# Patient Record
Sex: FEMALE | Race: WHITE | NOT HISPANIC OR LATINO
[De-identification: names, ages, dates, MRNs, and addresses within clinical notes are randomized per-mention and may not be internally consistent; named-entity substitution may affect disease eponyms.]

---

## 2024-01-30 ENCOUNTER — LABORATORY RESULT (OUTPATIENT)
Age: 54
End: 2024-01-30

## 2024-01-30 ENCOUNTER — NON-APPOINTMENT (OUTPATIENT)
Age: 54
End: 2024-01-30

## 2024-01-30 ENCOUNTER — TRANSCRIPTION ENCOUNTER (OUTPATIENT)
Age: 54
End: 2024-01-30

## 2024-01-30 ENCOUNTER — APPOINTMENT (OUTPATIENT)
Dept: OBGYN | Facility: CLINIC | Age: 54
End: 2024-01-30
Payer: COMMERCIAL

## 2024-01-30 VITALS
OXYGEN SATURATION: 96 % | DIASTOLIC BLOOD PRESSURE: 78 MMHG | SYSTOLIC BLOOD PRESSURE: 111 MMHG | BODY MASS INDEX: 26.33 KG/M2 | HEIGHT: 65 IN | WEIGHT: 158 LBS | HEART RATE: 85 BPM

## 2024-01-30 DIAGNOSIS — N95.1 MENOPAUSAL AND FEMALE CLIMACTERIC STATES: ICD-10-CM

## 2024-01-30 DIAGNOSIS — R87.810 CERVICAL HIGH RISK HUMAN PAPILLOMAVIRUS (HPV) DNA TEST POSITIVE: ICD-10-CM

## 2024-01-30 DIAGNOSIS — Z87.42 PERSONAL HISTORY OF OTHER DISEASES OF THE FEMALE GENITAL TRACT: ICD-10-CM

## 2024-01-30 DIAGNOSIS — R68.89 OTHER GENERAL SYMPTOMS AND SIGNS: ICD-10-CM

## 2024-01-30 DIAGNOSIS — L85.3 XEROSIS CUTIS: ICD-10-CM

## 2024-01-30 DIAGNOSIS — N95.2 POSTMENOPAUSAL ATROPHIC VAGINITIS: ICD-10-CM

## 2024-01-30 DIAGNOSIS — F32.A ANXIETY DISORDER, UNSPECIFIED: ICD-10-CM

## 2024-01-30 DIAGNOSIS — Z78.9 OTHER SPECIFIED HEALTH STATUS: ICD-10-CM

## 2024-01-30 DIAGNOSIS — F41.9 ANXIETY DISORDER, UNSPECIFIED: ICD-10-CM

## 2024-01-30 DIAGNOSIS — G47.00 INSOMNIA, UNSPECIFIED: ICD-10-CM

## 2024-01-30 DIAGNOSIS — N84.1 POLYP OF CERVIX UTERI: ICD-10-CM

## 2024-01-30 PROBLEM — Z00.00 ENCOUNTER FOR PREVENTIVE HEALTH EXAMINATION: Status: ACTIVE | Noted: 2024-01-30

## 2024-01-30 PROCEDURE — 99204 OFFICE O/P NEW MOD 45 MIN: CPT

## 2024-01-30 RX ORDER — BUSPIRONE HYDROCHLORIDE 7.5 MG/1
7.5 TABLET ORAL
Refills: 0 | Status: ACTIVE | COMMUNITY

## 2024-01-30 RX ORDER — ESTRADIOL 0.1 MG/G
0.1 CREAM VAGINAL
Qty: 1 | Refills: 3 | Status: ACTIVE | COMMUNITY
Start: 2024-01-30 | End: 1900-01-01

## 2024-01-30 RX ORDER — BUPROPION HYDROCHLORIDE 100 MG/1
TABLET, FILM COATED ORAL
Refills: 0 | Status: ACTIVE | COMMUNITY

## 2024-01-30 NOTE — DISCUSSION/SUMMARY
[FreeTextEntry1] : *found that the progesterone intensely interfere with mood then put on an ocp but got polyps and had to stop for irreg bleeding  had D and C for 2 cm polyp (benign) 2 yrs ago, no cycle, blood work showed menopause for sure (had been off pill for months at that point, but had been on ocp for yrs before that)

## 2024-01-30 NOTE — PHYSICAL EXAM
[Appropriately responsive] : appropriately responsive [Alert] : alert [No Acute Distress] : no acute distress [Soft] : soft [Non-tender] : non-tender [Non-distended] : non-distended [No HSM] : No HSM [No Lesions] : no lesions [No Mass] : no mass [Oriented x3] : oriented x3 [Labia Majora] : normal [Labia Minora] : normal [Atrophy] : atrophy [Normal] : normal [FreeTextEntry4] : expected atrophy of menopause

## 2024-01-30 NOTE — PLAN
[FreeTextEntry1] : here for problem visit  1. meonopause sx dry eye dry skin vaginal dryness insomnia and temp regulation issues mood changes: note on wellburtin and buspar and has therapist  had hpv 16/18 neg but other hpv positive on recent pap: I said I don't agree and may need colpo so repeat pap done today  *found that the progesterone intensely interfere with mood then put on an ocp but got polyps and had to stop for irreg bleeding  had D and C for 2 cm polyp (benign) 2 yrs ago, no cycle, blood work showed menopause for sure (had been off pill for months at that point, but had been on ocp for yrs before that)  *decided to try vaginal estrogen for now , d/w pt HT patch but she really hated micronized progesterone mood changes, r/b/a discussed, may use a bit in lower and upper eyelids as well dw pt she may do better with mirena and patch but not sure if she wants to go this route  Over 50% of face to face time of visit counseling and coordination of care. (tot 45 min)

## 2024-01-30 NOTE — HISTORY OF PRESENT ILLNESS
[Patient reported PAP Smear was normal] : Patient reported PAP Smear was normal [Y] : Positive pregnancy history [Currently Active] : currently active [Men] : men [No] : never pregnant [Difficulty with [place]] : difficulty with intercourse [Insomnia] : insomnia [Normal Amount/Duration] :  normal amount and duration [Regular Cycle Intervals] : periods have been regular [FreeTextEntry1] : Margi Nelson presents for establishment of care. She states she has questions regarding menopause. [PapSmeardate] : 12/2023 [PGHxTotal] : 2 [[facility]xLiving] : 2 [TextBox_6] : dryness [FreeTextEntry2] : got polyp on pill?

## 2024-02-05 LAB
CYTOLOGY CVX/VAG DOC THIN PREP: ABNORMAL
HPV HIGH+LOW RISK DNA PNL CVX: DETECTED

## 2024-02-22 ENCOUNTER — APPOINTMENT (OUTPATIENT)
Dept: OBGYN | Facility: CLINIC | Age: 54
End: 2024-02-22
Payer: COMMERCIAL

## 2024-02-22 DIAGNOSIS — R87.810 CERVICAL HIGH RISK HUMAN PAPILLOMAVIRUS (HPV) DNA TEST POSITIVE: ICD-10-CM

## 2024-02-22 PROCEDURE — 57454 BX/CURETT OF CERVIX W/SCOPE: CPT

## 2024-03-04 ENCOUNTER — TRANSCRIPTION ENCOUNTER (OUTPATIENT)
Age: 54
End: 2024-03-04

## 2024-03-04 LAB — CORE LAB BIOPSY: NORMAL

## 2024-03-20 ENCOUNTER — NON-APPOINTMENT (OUTPATIENT)
Age: 54
End: 2024-03-20

## 2024-03-22 ENCOUNTER — APPOINTMENT (OUTPATIENT)
Dept: GYNECOLOGIC ONCOLOGY | Facility: CLINIC | Age: 54
End: 2024-03-22
Payer: COMMERCIAL

## 2024-03-22 ENCOUNTER — NON-APPOINTMENT (OUTPATIENT)
Age: 54
End: 2024-03-22

## 2024-03-22 VITALS
BODY MASS INDEX: 26.33 KG/M2 | SYSTOLIC BLOOD PRESSURE: 110 MMHG | OXYGEN SATURATION: 94 % | HEIGHT: 65 IN | DIASTOLIC BLOOD PRESSURE: 77 MMHG | HEART RATE: 77 BPM | WEIGHT: 158 LBS | TEMPERATURE: 98 F

## 2024-03-22 DIAGNOSIS — N87.1 MODERATE CERVICAL DYSPLASIA: ICD-10-CM

## 2024-03-22 PROCEDURE — 99205 OFFICE O/P NEW HI 60 MIN: CPT

## 2024-03-22 NOTE — DISCUSSION/SUMMARY
[FreeTextEntry1] : 52yo referred by Dr. Garcia for at least PAUL 1 on ECC w/p HRHPV (not 16, 18, 45) performed 2/22.  With the aid of diagrams we reviewed the findings in detail.  We reviewed HPV its pathogenesis and the implications of an abnormal cervical cytology and the pathogenesis of dysplasia in detail. Studies have reported that 4 to 13 percent of women with PAUL 1 preceded by low-grade cytology will be diagnosed with PAUL 2, 3 within 6 to 24 months of follow-up. No studies have reported invasive cervical cancer in this patient population within this follow-up period, and thus is not really considered precancerous lesion.   ASCCP guidelines were reviewed with the patient. When PAUL 1 is preceded by low-grade cytologic findings (atypical squamous cells of undetermined significance [ASC-US] or low-grade intraepithelial squamous lesions [LSIL]), observation is advised rather than treatment, unless PAUL 1 persists for two or more years.   Given possible inadequate colposcopy, patient was given the options of LEEP vs repeat cotesting (as per ASCCP guidelines). Patient elected for LEEP.  The risks and benefits of LEEP were discussed which include, but are not limited to: bleeding, infection, cervical stenosis, cervical insufficiency. Possibility of needing a repeat procedure or hysterectomy was also discussed. I also discussed the possibility that no abnormality will be seen on the LEEP specimen.   [ ] patient to choose surgery date [ ] pre-op labs drawn today [ ] medical clearance by PCP

## 2024-03-25 ENCOUNTER — TRANSCRIPTION ENCOUNTER (OUTPATIENT)
Age: 54
End: 2024-03-25

## 2024-03-25 LAB
ALBUMIN SERPL ELPH-MCNC: 4.9 G/DL
ALP BLD-CCNC: 87 U/L
ALT SERPL-CCNC: 12 U/L
ANION GAP SERPL CALC-SCNC: 13 MMOL/L
APTT BLD: 29.6 SEC
AST SERPL-CCNC: 15 U/L
BASOPHILS # BLD AUTO: 0.03 K/UL
BASOPHILS NFR BLD AUTO: 0.5 %
BILIRUB SERPL-MCNC: 0.5 MG/DL
BUN SERPL-MCNC: 17 MG/DL
CALCIUM SERPL-MCNC: 9.7 MG/DL
CHLORIDE SERPL-SCNC: 104 MMOL/L
CO2 SERPL-SCNC: 23 MMOL/L
CREAT SERPL-MCNC: 0.93 MG/DL
EGFR: 73 ML/MIN/1.73M2
EOSINOPHIL # BLD AUTO: 0.12 K/UL
EOSINOPHIL NFR BLD AUTO: 1.9 %
GLUCOSE SERPL-MCNC: 92 MG/DL
HCT VFR BLD CALC: 41.7 %
HGB BLD-MCNC: 14 G/DL
IMM GRANULOCYTES NFR BLD AUTO: 0.2 %
INR PPP: 0.95 RATIO
LYMPHOCYTES # BLD AUTO: 1.69 K/UL
LYMPHOCYTES NFR BLD AUTO: 27.3 %
MAN DIFF?: NORMAL
MCHC RBC-ENTMCNC: 31.7 PG
MCHC RBC-ENTMCNC: 33.6 GM/DL
MCV RBC AUTO: 94.3 FL
MONOCYTES # BLD AUTO: 0.48 K/UL
MONOCYTES NFR BLD AUTO: 7.8 %
NEUTROPHILS # BLD AUTO: 3.85 K/UL
NEUTROPHILS NFR BLD AUTO: 62.3 %
PLATELET # BLD AUTO: 255 K/UL
POTASSIUM SERPL-SCNC: 4.3 MMOL/L
PROT SERPL-MCNC: 7.1 G/DL
PT BLD: 10.7 SEC
RBC # BLD: 4.42 M/UL
RBC # FLD: 12.3 %
SODIUM SERPL-SCNC: 140 MMOL/L
WBC # FLD AUTO: 6.18 K/UL

## 2024-03-25 NOTE — PHYSICAL EXAM
[Chaperone Present] : A chaperone was present in the examining room during all aspects of the physical examination [Normal] : Anus and perineum: Normal sphincter tone, no masses, no prolapse. [de-identified] : no gross evidence of dysplasia

## 2024-03-25 NOTE — HISTORY OF PRESENT ILLNESS
[FreeTextEntry1] : Problem: 1) at least PAUL 1  Previous Therapies: 1) 1/30/24: PAP neg, HPV + (16/18/45 neg) 2) 2/22/24: ECC                   a)   -   Squamous intraepithelial lesion, at least low-grade (see note)                   b)   -   Benign endocervical tissue  Note: Immunochemical stain for p16 shows focal somewhat strong expression in the dysplastic squamous epithelium, however, the overall features are not sufficient for a diagnosis of high-grade squamous intraepithelial lesion.

## 2024-03-25 NOTE — ASSESSMENT
[FreeTextEntry1] : 54yo referred by Dr. Garcia for at least PAUL 1 on ECC w/p HRHPV (non16, 18, 45) performed 2/22.  With the aid of diagrams we reviewed the findings in detail.  We reviewed HPV its pathogenesis and the implications of an abnormal cervical cytology and the pathogenesis of dysplasia in detail.  Studies have reported that 4 to 13 percent of women with PAUL 1 preceded by low-grade cytology will be diagnosed with PAUL 2, 3 within 6 to 24 months of follow-up. No studies have reported invasive cervical cancer in this patient population within this follow-up period, and thus is not really considered precancerous lesion.   ASCCP guidelines were reviewed with the patient. Her history was entered into the algorithm and returned a 5 year risk of CIN3 of 4.8%. For this reason, patient was offered close observation vs. LEEP. Patient elected for LEEP.  The risks and benefits of LEEP were discussed which include, but are not limited to: bleeding, infection, cervical stenosis, cervical insufficiency. Possibility of needing a repeat procedure or hysterectomy was also discussed. I also discussed the possibility that no abnormality will be seen on the LEEP specimen.  The patient presented with her partner for today's visit. All of their questions were answered to their apparent satisfaction. AHCC supplementation, and the evidence supporting it, were discussed in detail.    She will decide between MEETH and office procedure.  [] Pre-op labs today [] If chooses MEETH, will need medical clearance with PCP [] LEEP

## 2024-05-28 ENCOUNTER — APPOINTMENT (OUTPATIENT)
Dept: GYNECOLOGIC ONCOLOGY | Facility: CLINIC | Age: 54
End: 2024-05-28

## 2024-06-10 ENCOUNTER — TRANSCRIPTION ENCOUNTER (OUTPATIENT)
Age: 54
End: 2024-06-10

## 2024-07-25 ENCOUNTER — TRANSCRIPTION ENCOUNTER (OUTPATIENT)
Age: 54
End: 2024-07-25

## 2024-08-05 ENCOUNTER — APPOINTMENT (OUTPATIENT)
Dept: OBGYN | Facility: CLINIC | Age: 54
End: 2024-08-05

## 2024-08-05 ENCOUNTER — LABORATORY RESULT (OUTPATIENT)
Age: 54
End: 2024-08-05

## 2024-08-05 PROBLEM — R87.612 LGSIL ON PAP SMEAR OF CERVIX: Status: ACTIVE | Noted: 2024-08-05

## 2024-08-05 PROBLEM — Z92.29 HISTORY OF POSTMENOPAUSAL HRT: Status: ACTIVE | Noted: 2024-08-05

## 2024-08-05 PROBLEM — Z79.890 HORMONE REPLACEMENT THERAPY (HRT): Status: ACTIVE | Noted: 2024-08-05

## 2024-08-05 PROCEDURE — 99213 OFFICE O/P EST LOW 20 MIN: CPT

## 2024-08-05 PROCEDURE — 99459 PELVIC EXAMINATION: CPT

## 2024-08-05 PROCEDURE — G2211 COMPLEX E/M VISIT ADD ON: CPT | Mod: NC

## 2024-08-05 NOTE — PHYSICAL EXAM
[Chaperone Present] : A chaperone was present in the examining room during all aspects of the physical examination [[id number]] : A chaperone was present during the pelvic exam. [Appropriately responsive] : appropriately responsive [Alert] : alert [No Acute Distress] : no acute distress [Soft] : soft [Non-tender] : non-tender [Non-distended] : non-distended [No Lesions] : no lesions [No Mass] : no mass [Oriented x3] : oriented x3 [Labia Majora] : normal [Labia Minora] : normal [Normal] : normal [Uterine Adnexae] : normal [FreeTextEntry2] : Naty

## 2024-08-05 NOTE — PLAN
[FreeTextEntry1] : here for various gyn problems:  1. at least lgsil on ecc in past saw gyn onc Dr Dennis, she decided to wait and take [facility] pap and ecc done today she does plan on doing LEEP if stil positive   2. HRT discussion: she had been on pill then tried progesterone 100 mg but really did not like mood on it, had to stop after only 3 days I said I usually do patch but am concerned about her dramatic response to progesterone since she liked ocp in past, I said to try bijuva for now r/b/a discussed

## 2024-10-29 ENCOUNTER — RX RENEWAL (OUTPATIENT)
Age: 54
End: 2024-10-29